# Patient Record
Sex: FEMALE | Race: BLACK OR AFRICAN AMERICAN
[De-identification: names, ages, dates, MRNs, and addresses within clinical notes are randomized per-mention and may not be internally consistent; named-entity substitution may affect disease eponyms.]

---

## 2019-04-17 ENCOUNTER — HOSPITAL ENCOUNTER (EMERGENCY)
Dept: HOSPITAL 72 - EMR | Age: 24
Discharge: HOME | End: 2019-04-17
Payer: SELF-PAY

## 2019-04-17 VITALS — WEIGHT: 172 LBS | BODY MASS INDEX: 29.37 KG/M2 | HEIGHT: 64 IN

## 2019-04-17 VITALS — DIASTOLIC BLOOD PRESSURE: 92 MMHG | SYSTOLIC BLOOD PRESSURE: 106 MMHG

## 2019-04-17 DIAGNOSIS — J40: Primary | ICD-10-CM

## 2019-04-17 PROCEDURE — 99282 EMERGENCY DEPT VISIT SF MDM: CPT

## 2019-04-17 NOTE — EMERGENCY ROOM REPORT
History of Present Illness


General


Chief Complaint:  Headache


Source:  Patient





Present Illness


HPI


23-year-old female presents ED for evaluation.  Complaining of cough and 

headache 3 days.  States that her brother and grandmother also noted to be 

with similar symptoms.  Cough is dry.  Pain is dull, 5 out of 10, nonradiating.

  Denies fevers or chills.  Denies sore throat or earache.  Denies smoking or 

asthma history.  Denies recent travel.  No other aggravating relieving factors.

  Denies any other associated symptoms


Allergies:  


Coded Allergies:  


     PENICILLINS (Verified  Allergy, Unknown, 4/17/19)





Patient History


Past Medical History:  none


Past Surgical History:  none


Pertinent Family History:  none


Social History:  Denies: smoking, alcohol use, drug use


Last Menstrual Period:  Mar 2019, doesn't remember the date


Pregnant Now:  No


Immunizations:  UTD


Reviewed Nursing Documentation:  PMH: Agreed; PSxH: Agreed





Nursing Documentation-PMH


Past Medical History:  No Stated History





Review of Systems


All Other Systems:  negative except mentioned in HPI





Physical Exam





Vital Signs








  Date Time  Temp Pulse Resp B/P (MAP) Pulse Ox O2 Delivery O2 Flow Rate FiO2


 


4/17/19 20:50 99.7 101 20 106/92 98 Room Air  








Sp02 EP Interpretation:  reviewed, normal


General Appearance:  no apparent distress, alert, GCS 15, non-toxic


Head:  normocephalic, atraumatic


Eyes:  bilateral eye normal inspection, bilateral eye PERRL


ENT:  hearing grossly normal, normal pharynx, no angioedema, normal voice


Neck:  full range of motion, supple/symm/no masses


Respiratory:  chest non-tender, lungs clear, normal breath sounds, speaking 

full sentences


Cardiovascular #1:  regular rate, rhythm, no edema


Cardiovascular #2:  2+ carotid (R), 2+ carotid (L), 2+ radial (R), 2+ radial (L)

, 2+ dorsalis pedis (R), 2+ dorsalis pedis (L)


Gastrointestinal:  normal bowel sounds, non tender, soft, non-distended, no 

guarding, no rebound


Rectal:  deferred


Genitourinary:  normal inspection, no CVA tenderness


Musculoskeletal:  back normal, gait/station normal, normal range of motion, non-

tender


Neurologic:  alert, oriented x3, responsive, motor strength/tone normal, 

sensory intact, speech normal


Psychiatric:  judgement/insight normal, memory normal, mood/affect normal, no 

suicidal/homicidal ideation


Reflexes:  3+ bicep (R), 3+ bicep (L), 3+ tricep (R), 3+ tricep (L), 3+ knee (R)

, 3+ knee (L)


Skin:  normal color, no rash, warm/dry, well hydrated


Lymphatic:  no adenopathy





Medical Decision Making


Diagnostic Impression:  


 Primary Impression:  


 Bronchitis


ER Course


Hospital Course 


23-year-old female presents to ED complaining of cough, headache 





Differential diagnoses include: URI, pharyngitis, otitis media, asthma 





Clinical course


Patient placed on stretcher.  After initial history, physical exam reveals a 

female in no acute distress.  Bilateral TM unremarkable.  No pharyngeal 

erythema.  No tonsillar exudates.  No lymphadenopathy.  lungs clear. abdomen 

soft. 





Clinical findings consistent with bronchitis.  Discussed findings with patient.

  Course is viral and self-limited.  We will discharge with albuterol and cough 

medication.





Patient afebrile, nontoxic appearing.  Safe for discharge and close outpatient 

follow-up.  States she has a PMD





Diagnosis - bronchitis





Stable and discharged home with Rx albuterol, promethazine/codeine.  Instructed 

to followup with PMD.  Return to ED if symptoms recur or worsen





Last Vital Signs








  Date Time  Temp Pulse Resp B/P (MAP) Pulse Ox O2 Delivery O2 Flow Rate FiO2


 


4/17/19 21:39 99.7 101 20 106/92 98 Room Air  








Status:  improved


Disposition:  HOME, SELF-CARE


Condition:  Stable


Scripts


Codeine/Promethazine Hcl* (PROMETHAZINE-CODEINE SYRUP*) 118 Ml Syrup


5 ML ORAL Q6H PRN for For Cough, #118 ML 0 Refills


   Prov: Laci Blanchard MD         4/17/19 


Albuterol Sulfate* (ALBUTEROL SULFATE MDI*) 8.5 Gm Hfa.aer.ad


2 PUFF INH Q6H, #1 EA 0 Refills


   Prov: Laci Blanchard MD         4/17/19


Patient Instructions:  Acute Bronchitis, Easy-to-Read











Laci Blanchard MD Apr 17, 2019 22:29